# Patient Record
(demographics unavailable — no encounter records)

---

## 2025-02-06 NOTE — HISTORY OF PRESENT ILLNESS
[Postpartum Follow Up] : postpartum follow up [Delivery Date: ___] : on [unfilled] [] : delivered by vaginal delivery [Male] : Delivery History: baby boy [None] : The patient is currently asymptomatic [de-identified] : GEN: NAD: fundus: firm, NT [FreeTextEntry1] :   HPI: Delivery: 25   Male 6lb 8oz. w/ Dr. Dubose  Labs: gct normal   --------------------------------------------------------------------------------------------------------- ASSESSMENT & PLAN:  32 y/o  #2 wk s/p    EPDS: 0 Breast: yes and pumping Bottle: no  Contraception: desires IUD  rto 3 wk PP exam  rto 4 wk Paraguard Insertion w/ US guidance   Dr. Remedios Dubose, DO, MPH, FACOG

## 2025-02-06 NOTE — HISTORY OF PRESENT ILLNESS
[Postpartum Follow Up] : postpartum follow up [Delivery Date: ___] : on [unfilled] [] : delivered by vaginal delivery [Male] : Delivery History: baby boy [None] : The patient is currently asymptomatic [de-identified] : GEN: NAD: fundus: firm, NT [FreeTextEntry1] :   HPI: Delivery: 25   Male 6lb 8oz. w/ Dr. Dubose  Labs: gct normal   --------------------------------------------------------------------------------------------------------- ASSESSMENT & PLAN:  32 y/o  #2 wk s/p    EPDS: 0 Breast: yes and pumping Bottle: no  Contraception: desires IUD  rto 3 wk PP exam  rto 4 wk Paraguard Insertion w/ US guidance   Dr. Remedios Dubose, DO, MPH, FACOG

## 2025-03-06 NOTE — HISTORY OF PRESENT ILLNESS
[Postpartum Follow Up] : postpartum follow up [Delivery Date: ___] : on [unfilled] [] : delivered by vaginal delivery [None] : No associated symptoms are reported [Awake] : awake [Alert] : alert [Soft] : soft [Oriented x3] : oriented to person, place, and time [Labia Majora] : labia major [Labia Minora] : labia minora [Normal] : clitoris [No Bleeding] : there was no active vaginal bleeding [Uterine Adnexae] : were not tender and not enlarged [Acute Distress] : no acute distress [Tender] : non tender [Distended] : not distended [FreeTextEntry1] :   HPI:  5 wk PPA Delivery: 25  Male 6lb 8oz. w/ Dr. Dubose  Labs:  Last Pap:   --------------------------------------------------------------------------------------------------------- ASSESSMENT & PLAN:   32 y/o  #2 wk s/p   EPDS:  0 Breast: yes Bottle: yes Contraception: plans IUD   rto 3/4/25 paraguard IUD insert w/ US guidance  Dr. Remedios Dubose, DO, MPH, FACOG

## 2025-03-09 NOTE — PROCEDURE
[IUD Placement] : intrauterine device (IUD) placement [Time out performed] : Pre-procedure time out performed.  Patient's name, date of birth and procedure confirmed. [Consent Obtained] : Consent obtained [Prevention of Pregnancy] : prevention of pregnancy [Risks] : risks [Benefits] : benefits [Alternatives] : alternatives [Patient] : patient [Infection] : infection [Bleeding] : bleeding [Pain] : pain [Expulsion] : expulsion [Failure] : failure [Uterine Perforation] : uterine perforation [Neg Pregnancy Test] : negative pregnancy test [Betadine] : Betadine [Tenaculum] : Tenaculum [Easy Passage] : Easy passage [Sounded to ___ cm] : sounded to [unfilled] ~Ucm [Post Placement Transvag. US] : post placement transvaginal ultrasound [ParaGard IUD] : ParaGard IUD [US Guidance] : US Guidance [Tolerated Well] : Patient tolerated the procedure well [No Complications] : No complications [History of Unprotected [place]] : no history of unprotected intercourse [LMPDate] : 4/2024 [de-identified] : retroverted uterus  [de-identified] : [id number] [de-identified] : 4/2030 [de-identified] : 3/2037 [de-identified] : EBL <5 cc . hemostatic

## 2025-03-09 NOTE — PLAN
[FreeTextEntry1] :  -------------------------------   30 y/o   #s/p  25 #paraguard INSERTION w/ US guidance -ucg neg -denies coitus since birth -consent -gc/c -IUD inserted w/ ease -instructions provided -normal post-placement US  rto 2 month TV US IUD check  Dr. Remedios Dubose DO, MPH, FACOG

## 2025-03-09 NOTE — PLAN
[FreeTextEntry1] :  -------------------------------   32 y/o   #s/p  25 #paraguard INSERTION w/ US guidance -ucg neg -denies coitus since birth -consent -gc/c -IUD inserted w/ ease -instructions provided -normal post-placement US  rto 2 month TV US IUD check  Dr. Remedios Dubose DO, MPH, FACOG

## 2025-03-09 NOTE — PROCEDURE
[IUD Placement] : intrauterine device (IUD) placement [Time out performed] : Pre-procedure time out performed.  Patient's name, date of birth and procedure confirmed. [Consent Obtained] : Consent obtained [Prevention of Pregnancy] : prevention of pregnancy [Risks] : risks [Benefits] : benefits [Alternatives] : alternatives [Patient] : patient [Infection] : infection [Bleeding] : bleeding [Pain] : pain [Expulsion] : expulsion [Failure] : failure [Uterine Perforation] : uterine perforation [Neg Pregnancy Test] : negative pregnancy test [Betadine] : Betadine [Tenaculum] : Tenaculum [Easy Passage] : Easy passage [Sounded to ___ cm] : sounded to [unfilled] ~Ucm [Post Placement Transvag. US] : post placement transvaginal ultrasound [ParaGard IUD] : ParaGard IUD [US Guidance] : US Guidance [Tolerated Well] : Patient tolerated the procedure well [No Complications] : No complications [History of Unprotected [place]] : no history of unprotected intercourse [LMPDate] : 4/2024 [de-identified] : retroverted uterus  [de-identified] : [id number] [de-identified] : 4/2030 [de-identified] : 3/2037 [de-identified] : EBL <5 cc . hemostatic